# Patient Record
Sex: MALE | URBAN - METROPOLITAN AREA
[De-identification: names, ages, dates, MRNs, and addresses within clinical notes are randomized per-mention and may not be internally consistent; named-entity substitution may affect disease eponyms.]

---

## 2021-03-06 ENCOUNTER — HOSPITAL ENCOUNTER (EMERGENCY)
Facility: MEDICAL CENTER | Age: 26
End: 2021-03-06
Attending: EMERGENCY MEDICINE | Admitting: EMERGENCY MEDICINE

## 2021-03-06 VITALS
SYSTOLIC BLOOD PRESSURE: 146 MMHG | TEMPERATURE: 98.1 F | HEART RATE: 74 BPM | WEIGHT: 159.39 LBS | DIASTOLIC BLOOD PRESSURE: 89 MMHG | RESPIRATION RATE: 15 BRPM | OXYGEN SATURATION: 98 %

## 2021-03-06 DIAGNOSIS — K08.89 PAIN, DENTAL: ICD-10-CM

## 2021-03-06 PROCEDURE — 99283 EMERGENCY DEPT VISIT LOW MDM: CPT

## 2021-03-06 PROCEDURE — A9270 NON-COVERED ITEM OR SERVICE: HCPCS | Performed by: EMERGENCY MEDICINE

## 2021-03-06 PROCEDURE — 700102 HCHG RX REV CODE 250 W/ 637 OVERRIDE(OP): Performed by: EMERGENCY MEDICINE

## 2021-03-06 RX ORDER — IBUPROFEN 600 MG/1
600 TABLET ORAL ONCE
Status: COMPLETED | OUTPATIENT
Start: 2021-03-06 | End: 2021-03-06

## 2021-03-06 RX ORDER — CLINDAMYCIN HYDROCHLORIDE 300 MG/1
300 CAPSULE ORAL 4 TIMES DAILY
Qty: 28 CAPSULE | Refills: 0 | Status: SHIPPED | OUTPATIENT
Start: 2021-03-06 | End: 2021-03-13

## 2021-03-06 RX ADMIN — IBUPROFEN 600 MG: 600 TABLET ORAL at 08:25

## 2021-03-06 NOTE — ED NOTES
ERP eval complete. Pt given pain med and script for abx. Pt also given dental referral sheet. Pt states understanding of dc instructions and f/u care. Pt able to amb out w/ steady gait.

## 2021-03-06 NOTE — ED PROVIDER NOTES
ED Provider Note      CHIEF COMPLAINT  Chief Complaint   Patient presents with   • Dental Pain     Chinese speaking only c/o dental pain in right upper molar x few days worse last night. no facial swelling noted.       HPI  Laci Yusuf is a 26 y.o. male who presents with dental pain. Pain started few days ago.  Was off and on initially but has become constant. Pain is located right upper posterior tooth. Pain is severe. Constant. It is getting worse. No fevers. No neck stiffness or headache. Swelling is not present.        REVIEW OF SYSTEMS  see HPI    PAST MEDICAL HISTORY  No past medical history on file.    SOCIAL HISTORY  He will be in [place] for the next 8 months with his company working.  Does not have a dentist.    ALLERGIES  Penicillin    PHYSICAL EXAM  VITAL SIGNS: /89   Pulse 74   Temp 36.7 °C (98.1 °F) (Temporal)   Resp 15   Wt 72.3 kg (159 lb 6.3 oz)   SpO2 98%   Constitutional: Well developed, Well nourished, No acute distress, Non-toxic appearance.   HENT:  Atraumatic, Normocephalic. Bilateral external ears normal, Oropharynx tenderness over the right first maxillary molar.  No surrounding swelling.  Eyes: Grossly Normal inspection  Neck: Normal range of motion  Cardiovascular: Normal heart rate  Thorax & Lungs: No respiratory distress  Skin: No rash.     COURSE & MEDICAL DECISION MAKING  Patient with dental caries. No sign of abscess, but suspect some degree of infection given worsening pain. We'll treat with has penicillin allergy.  Will be treated with clindamycin.  Advised Tylenol and ibuprofen.  I have advised followup with dentist as soon as possible. Asked to return to the emergency department for swelling, high fevers, or concern.    FINAL IMPRESSION  1. dental caries      This dictation was created using voice recognition software. The accuracy of the dictation is limited to the abilities of the software.   The nursing notes were reviewed and certain aspects of this  information were incorporated into this note.      Electronically signed by: Kings Villanueva M.D., 3/6/2021 8:14 AM

## 2021-03-06 NOTE — ED NOTES
Chief Complaint   Patient presents with   • Dental Pain     Maori speaking only c/o dental pain in right upper molar x few days worse last night. no facial swelling noted.     Educated on triage process. Instructed to notify staff for any worsening symptoms. Denies any recent travel. Denies exposure to known covid positive patients. Denies any respiratory symptoms. ipad  used Bharath [id number]